# Patient Record
Sex: MALE | Race: WHITE | NOT HISPANIC OR LATINO | URBAN - METROPOLITAN AREA
[De-identification: names, ages, dates, MRNs, and addresses within clinical notes are randomized per-mention and may not be internally consistent; named-entity substitution may affect disease eponyms.]

---

## 2022-04-01 ENCOUNTER — OUTPATIENT (OUTPATIENT)
Dept: OUTPATIENT SERVICES | Facility: HOSPITAL | Age: 52
LOS: 1 days | End: 2022-04-01
Payer: COMMERCIAL

## 2022-04-01 VITALS
SYSTOLIC BLOOD PRESSURE: 123 MMHG | OXYGEN SATURATION: 98 % | WEIGHT: 160.94 LBS | RESPIRATION RATE: 14 BRPM | HEART RATE: 68 BPM | TEMPERATURE: 97 F | HEIGHT: 65 IN | DIASTOLIC BLOOD PRESSURE: 67 MMHG

## 2022-04-01 DIAGNOSIS — K40.90 UNILATERAL INGUINAL HERNIA, WITHOUT OBSTRUCTION OR GANGRENE, NOT SPECIFIED AS RECURRENT: ICD-10-CM

## 2022-04-01 DIAGNOSIS — Z98.890 OTHER SPECIFIED POSTPROCEDURAL STATES: Chronic | ICD-10-CM

## 2022-04-01 DIAGNOSIS — Z01.818 ENCOUNTER FOR OTHER PREPROCEDURAL EXAMINATION: ICD-10-CM

## 2022-04-01 PROBLEM — Z00.00 ENCOUNTER FOR PREVENTIVE HEALTH EXAMINATION: Status: ACTIVE | Noted: 2022-04-01

## 2022-04-01 LAB
ANION GAP SERPL CALC-SCNC: 5 MMOL/L — SIGNIFICANT CHANGE UP (ref 5–17)
BUN SERPL-MCNC: 15 MG/DL — SIGNIFICANT CHANGE UP (ref 7–23)
CALCIUM SERPL-MCNC: 8.8 MG/DL — SIGNIFICANT CHANGE UP (ref 8.4–10.5)
CHLORIDE SERPL-SCNC: 103 MMOL/L — SIGNIFICANT CHANGE UP (ref 96–108)
CO2 SERPL-SCNC: 29 MMOL/L — SIGNIFICANT CHANGE UP (ref 22–31)
CREAT SERPL-MCNC: 1.27 MG/DL — SIGNIFICANT CHANGE UP (ref 0.5–1.3)
EGFR: 68 ML/MIN/1.73M2 — SIGNIFICANT CHANGE UP
GLUCOSE SERPL-MCNC: 107 MG/DL — HIGH (ref 70–99)
HCT VFR BLD CALC: 45.4 % — SIGNIFICANT CHANGE UP (ref 39–50)
HGB BLD-MCNC: 15.8 G/DL — SIGNIFICANT CHANGE UP (ref 13–17)
MCHC RBC-ENTMCNC: 31.2 PG — SIGNIFICANT CHANGE UP (ref 27–34)
MCHC RBC-ENTMCNC: 34.8 GM/DL — SIGNIFICANT CHANGE UP (ref 32–36)
MCV RBC AUTO: 89.5 FL — SIGNIFICANT CHANGE UP (ref 80–100)
NRBC # BLD: 0 /100 WBCS — SIGNIFICANT CHANGE UP (ref 0–0)
PLATELET # BLD AUTO: 237 K/UL — SIGNIFICANT CHANGE UP (ref 150–400)
POTASSIUM SERPL-MCNC: 4.1 MMOL/L — SIGNIFICANT CHANGE UP (ref 3.5–5.3)
POTASSIUM SERPL-SCNC: 4.1 MMOL/L — SIGNIFICANT CHANGE UP (ref 3.5–5.3)
RBC # BLD: 5.07 M/UL — SIGNIFICANT CHANGE UP (ref 4.2–5.8)
RBC # FLD: 11.9 % — SIGNIFICANT CHANGE UP (ref 10.3–14.5)
SODIUM SERPL-SCNC: 137 MMOL/L — SIGNIFICANT CHANGE UP (ref 135–145)
WBC # BLD: 5.71 K/UL — SIGNIFICANT CHANGE UP (ref 3.8–10.5)
WBC # FLD AUTO: 5.71 K/UL — SIGNIFICANT CHANGE UP (ref 3.8–10.5)

## 2022-04-01 PROCEDURE — 80048 BASIC METABOLIC PNL TOTAL CA: CPT

## 2022-04-01 PROCEDURE — 36415 COLL VENOUS BLD VENIPUNCTURE: CPT

## 2022-04-01 PROCEDURE — G0463: CPT

## 2022-04-01 PROCEDURE — 85027 COMPLETE CBC AUTOMATED: CPT

## 2022-04-01 PROCEDURE — 93005 ELECTROCARDIOGRAM TRACING: CPT

## 2022-04-01 PROCEDURE — 93010 ELECTROCARDIOGRAM REPORT: CPT

## 2022-04-01 NOTE — H&P PST ADULT - GENERAL
Rx for Zpak sent to pharmacy.  Follow-up if still not improving after another week or symptoms worsen.  Thanks.    Chidi Valenzuela MD    negative

## 2022-04-01 NOTE — H&P PST ADULT - NSICDXPASTMEDICALHX_GEN_ALL_CORE_FT
PAST MEDICAL HISTORY:  Right inguinal hernia      PAST MEDICAL HISTORY:  COVID-19 vaccine series declined     Right inguinal hernia

## 2022-04-01 NOTE — H&P PST ADULT - PROBLEM SELECTOR PLAN 1
scheduled for right inguinal hernia repair on 4/14/22  Pre op instructions  COVID test on 4/12/22 scheduled for right inguinal hernia repair on 4/14/22  patient was referred to cardiologist due to abnormal EKG , cardiologist  will address the EKG in the clearance   Pre op instructions  COVID test on 4/11/22 at 8;30 am

## 2022-04-01 NOTE — H&P PST ADULT - NSANTHOSAYNRD_GEN_A_CORE
No. CHARISSE screening performed.  STOP BANG Legend: 0-2 = LOW Risk; 3-4 = INTERMEDIATE Risk; 5-8 = HIGH Risk

## 2022-04-01 NOTE — H&P PST ADULT - HISTORY OF PRESENT ILLNESS
This is a 51 y/o male with complainrt right inguinal hernia  This is a 51 y/o male presents with complaint of right inguinal hernia . Reports right inguinal pain, bulging  with lifting and activities  patient states that he felt a sharp pain while playing baseball 4 year ago since then he has been experiencing right groin pain and discomfort  scheduled for right inguinal hernia repair on 4/14/22

## 2022-04-01 NOTE — H&P PST ADULT - NSICDXFAMILYHX_GEN_ALL_CORE_FT
FAMILY HISTORY:  Father  Still living? No  FH: lymphoma, Age at diagnosis: Age Unknown    Mother  Still living? Yes, Estimated age: 71-80  FH: CHF (congestive heart failure), Age at diagnosis: Age Unknown  FH: type 2 diabetes mellitus, Age at diagnosis: Age Unknown

## 2022-04-04 RX ORDER — CHLORHEXIDINE GLUCONATE 213 G/1000ML
1 SOLUTION TOPICAL ONCE
Refills: 0 | Status: DISCONTINUED | OUTPATIENT
Start: 2022-04-14 | End: 2022-04-28

## 2022-04-11 ENCOUNTER — OUTPATIENT (OUTPATIENT)
Dept: OUTPATIENT SERVICES | Facility: HOSPITAL | Age: 52
LOS: 1 days | End: 2022-04-11
Payer: COMMERCIAL

## 2022-04-11 DIAGNOSIS — Z20.828 CONTACT WITH AND (SUSPECTED) EXPOSURE TO OTHER VIRAL COMMUNICABLE DISEASES: ICD-10-CM

## 2022-04-11 DIAGNOSIS — Z98.890 OTHER SPECIFIED POSTPROCEDURAL STATES: Chronic | ICD-10-CM

## 2022-04-11 LAB — SARS-COV-2 RNA SPEC QL NAA+PROBE: SIGNIFICANT CHANGE UP

## 2022-04-11 PROCEDURE — U0005: CPT

## 2022-04-11 PROCEDURE — U0003: CPT

## 2022-04-13 ENCOUNTER — TRANSCRIPTION ENCOUNTER (OUTPATIENT)
Age: 52
End: 2022-04-13

## 2022-04-13 NOTE — ASU PATIENT PROFILE, ADULT - FALL HARM RISK - UNIVERSAL INTERVENTIONS
Bed in lowest position, wheels locked, appropriate side rails in place/Call bell, personal items and telephone in reach/Instruct patient to call for assistance before getting out of bed or chair/Non-slip footwear when patient is out of bed/[place] to call system/Physically safe environment - no spills, clutter or unnecessary equipment/Purposeful Proactive Rounding/Room/bathroom lighting operational, light cord in reach

## 2022-04-14 ENCOUNTER — TRANSCRIPTION ENCOUNTER (OUTPATIENT)
Age: 52
End: 2022-04-14

## 2022-04-14 ENCOUNTER — OUTPATIENT (OUTPATIENT)
Dept: OUTPATIENT SERVICES | Facility: HOSPITAL | Age: 52
LOS: 1 days | End: 2022-04-14
Payer: COMMERCIAL

## 2022-04-14 ENCOUNTER — RESULT REVIEW (OUTPATIENT)
Age: 52
End: 2022-04-14

## 2022-04-14 VITALS
TEMPERATURE: 97 F | HEART RATE: 82 BPM | OXYGEN SATURATION: 100 % | DIASTOLIC BLOOD PRESSURE: 77 MMHG | HEIGHT: 66 IN | SYSTOLIC BLOOD PRESSURE: 128 MMHG | WEIGHT: 155.21 LBS | RESPIRATION RATE: 14 BRPM

## 2022-04-14 VITALS
DIASTOLIC BLOOD PRESSURE: 80 MMHG | RESPIRATION RATE: 14 BRPM | OXYGEN SATURATION: 99 % | SYSTOLIC BLOOD PRESSURE: 124 MMHG | HEART RATE: 62 BPM

## 2022-04-14 DIAGNOSIS — K40.90 UNILATERAL INGUINAL HERNIA, WITHOUT OBSTRUCTION OR GANGRENE, NOT SPECIFIED AS RECURRENT: ICD-10-CM

## 2022-04-14 DIAGNOSIS — K40.20 BILATERAL INGUINAL HERNIA, WITHOUT OBSTRUCTION OR GANGRENE, NOT SPECIFIED AS RECURRENT: ICD-10-CM

## 2022-04-14 DIAGNOSIS — Z01.818 ENCOUNTER FOR OTHER PREPROCEDURAL EXAMINATION: ICD-10-CM

## 2022-04-14 DIAGNOSIS — Z98.890 OTHER SPECIFIED POSTPROCEDURAL STATES: Chronic | ICD-10-CM

## 2022-04-14 PROCEDURE — 49505 PRP I/HERN INIT REDUC >5 YR: CPT | Mod: 50

## 2022-04-14 PROCEDURE — 88302 TISSUE EXAM BY PATHOLOGIST: CPT

## 2022-04-14 PROCEDURE — C1781: CPT

## 2022-04-14 PROCEDURE — 88302 TISSUE EXAM BY PATHOLOGIST: CPT | Mod: 26

## 2022-04-14 DEVICE — PLUG AND PATCH PHASIX 1.6X1.3IN MED: Type: IMPLANTABLE DEVICE | Site: BILATERAL | Status: FUNCTIONAL

## 2022-04-14 DEVICE — PLUG AND PATCH PHASIX LG 4.1X4.8CM: Type: IMPLANTABLE DEVICE | Site: BILATERAL | Status: FUNCTIONAL

## 2022-04-14 RX ORDER — SODIUM CHLORIDE 9 MG/ML
1000 INJECTION, SOLUTION INTRAVENOUS
Refills: 0 | Status: DISCONTINUED | OUTPATIENT
Start: 2022-04-14 | End: 2022-04-15

## 2022-04-14 RX ORDER — HYDROMORPHONE HYDROCHLORIDE 2 MG/ML
0.5 INJECTION INTRAMUSCULAR; INTRAVENOUS; SUBCUTANEOUS
Refills: 0 | Status: DISCONTINUED | OUTPATIENT
Start: 2022-04-14 | End: 2022-04-15

## 2022-04-14 RX ORDER — HYDROMORPHONE HYDROCHLORIDE 2 MG/ML
1 INJECTION INTRAMUSCULAR; INTRAVENOUS; SUBCUTANEOUS
Refills: 0 | Status: DISCONTINUED | OUTPATIENT
Start: 2022-04-14 | End: 2022-04-15

## 2022-04-14 RX ORDER — OXYCODONE HYDROCHLORIDE 5 MG/1
1 TABLET ORAL
Qty: 10 | Refills: 0
Start: 2022-04-14

## 2022-04-14 RX ORDER — ONDANSETRON 8 MG/1
4 TABLET, FILM COATED ORAL ONCE
Refills: 0 | Status: DISCONTINUED | OUTPATIENT
Start: 2022-04-14 | End: 2022-04-15

## 2022-04-14 RX ORDER — METOCLOPRAMIDE HCL 10 MG
10 TABLET ORAL ONCE
Refills: 0 | Status: DISCONTINUED | OUTPATIENT
Start: 2022-04-14 | End: 2022-04-15

## 2022-04-14 RX ADMIN — SODIUM CHLORIDE 75 MILLILITER(S): 9 INJECTION, SOLUTION INTRAVENOUS at 13:30

## 2022-04-14 NOTE — ASU DISCHARGE PLAN (ADULT/PEDIATRIC) - CARE PROVIDER_API CALL
Radha Torres (MD)  ColonRectal Surgery; Surgery  [address] [facility] [address], [address]  [place] [address]  Phone: [phone]  Fax: [phone]  Follow Up Time:

## 2022-04-14 NOTE — BRIEF OPERATIVE NOTE - NSICDXBRIEFPROCEDURE_GEN_ALL_CORE_FT
PROCEDURES:  Open repair of inguinal hernia using mesh in adult 14-Apr-2022 13:40:34 bilateral Irlanda Ren

## 2022-04-14 NOTE — ASU DISCHARGE PLAN (ADULT/PEDIATRIC) - NS MD DC FALL RISK RISK
For information on Fall & Injury Prevention, visit: [email or web address] OR  [email or web address] OR  https://www.cdc.gov/steadi/patient.html

## 2022-04-14 NOTE — ASU DISCHARGE PLAN (ADULT/PEDIATRIC) - ASU DC SPECIAL INSTRUCTIONSFT
REST! Apply ice packs to incision sites 20 mins on, 20 mins off every 4 hours for first 24 hours.     You may take plain Tylenol, ibuprofen (Advil, Motrin), or Aleve if you wish alternating with oxycodone prescription for severe pain     If having trouble having a bowel movement:   - Metamucil or Konsyl (fiber supplement, available over the counter), 1 tablespoon in 8 oz. of water or juice twice a day  - Colace (stool softener, available over the counter), 100mg three times a day    Keep white steri strips on until follow up at office. Sometimes they fall off on own and thats okay. Okay to shower in 24 hours.     Please call [facility] office ([phone]) to schedule a follow-up appointment to be seen in 10-14 days.

## 2022-04-18 LAB — SURGICAL PATHOLOGY STUDY: SIGNIFICANT CHANGE UP

## 2022-05-17 ENCOUNTER — EMERGENCY (EMERGENCY)
Facility: HOSPITAL | Age: 52
LOS: 1 days | Discharge: HOME | End: 2022-05-17
Attending: INTERNAL MEDICINE
Payer: COMMERCIAL

## 2022-05-17 VITALS
OXYGEN SATURATION: 97 % | WEIGHT: 154.32 LBS | TEMPERATURE: 98 F | DIASTOLIC BLOOD PRESSURE: 80 MMHG | RESPIRATION RATE: 18 BRPM | SYSTOLIC BLOOD PRESSURE: 117 MMHG | HEIGHT: 66 IN | HEART RATE: 74 BPM

## 2022-05-17 VITALS
DIASTOLIC BLOOD PRESSURE: 71 MMHG | HEART RATE: 77 BPM | OXYGEN SATURATION: 97 % | RESPIRATION RATE: 19 BRPM | SYSTOLIC BLOOD PRESSURE: 120 MMHG | TEMPERATURE: 98 F

## 2022-05-17 DIAGNOSIS — Z98.890 OTHER SPECIFIED POSTPROCEDURAL STATES: Chronic | ICD-10-CM

## 2022-05-17 PROCEDURE — 99284 EMERGENCY DEPT VISIT MOD MDM: CPT

## 2022-05-17 PROCEDURE — 99284 EMERGENCY DEPT VISIT MOD MDM: CPT | Mod: 25

## 2022-05-17 PROCEDURE — 93971 EXTREMITY STUDY: CPT | Mod: 26,LT

## 2022-05-17 PROCEDURE — 93971 EXTREMITY STUDY: CPT

## 2022-05-17 NOTE — ED PROVIDER NOTE - NS ED ATTENDING STATEMENT MOD
This was a shared visit with the DUSTY. I reviewed and verified the documentation and independently performed the documented:

## 2022-05-17 NOTE — ED PROVIDER NOTE - OBJECTIVE STATEMENT
otherwise healthy 52 year old male presents with left calf discomfort that started today. felt a sudden pain to left calf today. no specific injury or trauma. then pain improved, but returned a couple more times throughout the day. has a slight occasional throb to calf just distal to popliteal region.  no pain with ambulation. no swelling. no warmth or erythema. reports having bilateral inguinal hernia repair 5 weeks ago and "wants to make sure he doesn't have a DVT". no chest pain or shortness of breath. no history of DVT or PE. no history of cancer   PCP Toni Bui

## 2022-05-17 NOTE — ED PROVIDER NOTE - NSFOLLOWUPINSTRUCTIONS_ED_ALL_ED_FT
rest, ice, compression, elevation, support  motrin over the counter, 600 mg three times a day with food  follow up with primary care provider if pain continues          Muscle Strain    WHAT YOU NEED TO KNOW:    A muscle strain is a twist, pull, or tear of a muscle or tendon. A tendon is a strong elastic tissue that connects a muscle to a bone. Signs of a strained muscle include bruising and swelling over the area, pain with movement, and loss of strength.    DISCHARGE INSTRUCTIONS:    Return to the emergency department if:   •You suddenly cannot feel or move your injured muscle.          Call your doctor if:   •Your pain and swelling worsen or do not go away.      •You have questions or concerns about your condition or care.      Medicines: You may need any of the following:  •NSAIDs help decrease swelling and pain or fever. This medicine is available with or without a doctor's order. NSAIDs can cause stomach bleeding or kidney problems in certain people. If you take blood thinner medicine, always ask your healthcare provider if NSAIDs are safe for you. Always read the medicine label and follow directions.      •Muscle relaxers help decrease pain and muscle spasms.      •Take your medicine as directed. Contact your healthcare provider if you think your medicine is not helping or if you have side effects. Tell him of her if you are allergic to any medicine. Keep a list of the medicines, vitamins, and herbs you take. Include the amounts, and when and why you take them. Bring the list or the pill bottles to follow-up visits. Carry your medicine list with you in case of an emergency.      Help a muscle strain heal:   •3 to 7 days after the injury: Use Rest, Ice, Compression, and Elevation (RICE) to help stop bruising and decrease pain and swelling.?Rest your muscle to allow the injury to heal. When the pain decreases, begin normal, slow movements. For mild and moderate muscle strains, you should rest your muscles for about 2 days. If you have a severe muscle strain, you should rest for 10 to 14 days. You may need to use crutches to walk if your muscle strain is in your legs or lower body.      ?Apply ice on the injured area. Use an ice pack, or put crushed ice in a plastic bag. Cover the bag with a towel before you apply it to your skin. Apply ice for 15 to 20 minutes each hour, or as directed.      ?Use compression to decrease swelling. You can wrap an elastic bandage around the area to create compression. The bandage should be tight enough for you to feel support. Do not wrap it too tightly.  How to Wrap an Elastic Bandage           ?Elevate the area above the level of your heart, if possible. Keep the injured muscle raised above your heart if possible. For example, if you have a strain of your lower leg muscle, lie down and prop your leg up on pillows. This helps decrease pain and swelling.  Elevate Leg           •3 to 21 days after your injury: Start to slowly and regularly exercise your strained muscle. This will help it heal. If you feel pain, decrease how hard you are exercising.      •1 to 6 weeks after your injury: Stretch the injured muscle. Stretch the muscle for about 30 seconds. Do this 4 times a day. You may stretch the muscle until you feel a slight pull. Stop stretching if you feel pain.      •2 weeks to 6 months after your injury: The goal of this phase is to return to the activity you were doing before the injury without hurting the muscle again.      •3 weeks to 6 months after your injury: Keep stretching and strengthening your muscles to prevent injury. Slowly increase the time and distance that you exercise. You may still have signs and symptoms of muscle strain 6 months after the injury, even if you do things to help it heal. In this case, you may need surgery on the muscle.      Prevent muscle strains:   •Always wear proper shoes when you play sports. Replace your old running shoes with new ones often if you are a runner. Use special shoe inserts or arch supports to correct leg or foot problems. Ask your healthcare provider for more information on shoe supports.      •Do warm up and cool down exercises. Do stretching exercises before you work out or do sports activities. These exercises will help loosen and decrease stress on your muscles. Cool down and stretch after your workout. Do not stop and rest after a workout without cooling down first.  Warm up and Cool Down            •Keep your muscles strong with strength training exercises. Exercises such as weight lifting and stretching exercises help keep your muscles flexible and strong. A physical therapist or  may help you with these exercises.  Strength Training for Adults           •Slowly start your exercise or sports training program. Follow your healthcare provider's advice on when to start exercising. Slowly increase time, distance, and how often you train. Sudden increases in how often you train may cause you to injure your muscle again.      Follow up with your doctor as directed: Your doctor may suggest that you have a follow-up visit before you go back to your usual activity. Write down your questions so you remember to ask them during your visits.

## 2022-05-17 NOTE — ED ADULT NURSE NOTE - OBJECTIVE STATEMENT
patient has c/o left groin pain patient has c/o left groin pain, recent hernia surgery b/l groin area, c/o tightness and throbbing pain, denies chest pain or sob at this time, will continue to monitor.

## 2022-05-17 NOTE — ED ADULT NURSE NOTE - TEMPLATE
Thalidomide Counseling: I discussed with the patient the risks of thalidomide including but not limited to birth defects, anxiety, weakness, chest pain, dizziness, cough and severe allergy. General

## 2022-05-17 NOTE — ED PROVIDER NOTE - PATIENT PORTAL LINK FT
You can access the FollowMyHealth Patient Portal offered by [facility] by registering at the following website: [email or web address]. By joining [employer]’s FollowMyHealth portal, you will also be able to view your health information using other applications (apps) compatible with our system.

## 2022-05-17 NOTE — ED ADULT NURSE NOTE - NS ED NURSE RECORD ANOTHER HT AND WT
[FreeTextEntry1] : 58 yo pt here for fu and vulvar bx. reports she gets severe pain from simple touch near clitoris, has fibromyalgia, tent dx sjogrens recently. wonders if she has vulvodynia and wants to discuss tx. already take tricyclic antidepressant for fibromyalgia and ibs. spot that gets a small tear on inner lab minora is bothering her today. Yes

## 2022-05-17 NOTE — ED PROVIDER NOTE - MUSCULOSKELETAL, MLM
mild soft tissue tenderness just distal to left popliteal fossa. no bony tenderness. full ROM. no swelling or palpable cords

## 2022-05-17 NOTE — ED PROVIDER NOTE - CLINICAL SUMMARY MEDICAL DECISION MAKING FREE TEXT BOX
52 year old male presents with left calf discomfort that started today. felt a sudden pain to left calf today. no specific injury or trauma. then pain improved, but returned a couple more times throughout the day. has a slight occasional throb to calf just distal to popliteal region. no cp, no sob, no fever, no chills. He came to the ED for doppler to R/O DVT.  US was reported negative for DVT, discharged home in stable condition and to F/U O/P

## 2022-05-17 NOTE — ED PROVIDER NOTE - CARE PROVIDER_API CALL
OMAYRA MERCHANT  Cardiovascular Diseases  [address] [place] [address]  Phone: [phone]  Fax: [phone]  Follow Up Time: 1-3 Days

## 2022-05-18 PROBLEM — K40.90 UNILATERAL INGUINAL HERNIA, WITHOUT OBSTRUCTION OR GANGRENE, NOT SPECIFIED AS RECURRENT: Chronic | Status: ACTIVE | Noted: 2022-04-01

## 2022-05-18 PROBLEM — Z28.21 IMMUNIZATION NOT CARRIED OUT BECAUSE OF PATIENT REFUSAL: Chronic | Status: ACTIVE | Noted: 2022-04-01

## 2022-09-13 ENCOUNTER — EMERGENCY (EMERGENCY)
Facility: HOSPITAL | Age: 52
LOS: 1 days | Discharge: HOME | End: 2022-09-13
Attending: EMERGENCY MEDICINE | Admitting: EMERGENCY MEDICINE
Payer: COMMERCIAL

## 2022-09-13 VITALS
HEART RATE: 73 BPM | RESPIRATION RATE: 18 BRPM | DIASTOLIC BLOOD PRESSURE: 76 MMHG | WEIGHT: 154.32 LBS | OXYGEN SATURATION: 97 % | SYSTOLIC BLOOD PRESSURE: 119 MMHG | HEIGHT: 66 IN | TEMPERATURE: 98 F

## 2022-09-13 DIAGNOSIS — Z98.890 OTHER SPECIFIED POSTPROCEDURAL STATES: Chronic | ICD-10-CM

## 2022-09-13 LAB
ALBUMIN SERPL ELPH-MCNC: 4.4 G/DL — SIGNIFICANT CHANGE UP (ref 3.3–5)
ALP SERPL-CCNC: 87 U/L — SIGNIFICANT CHANGE UP (ref 30–120)
ALT FLD-CCNC: 27 U/L DA — SIGNIFICANT CHANGE UP (ref 10–60)
ANION GAP SERPL CALC-SCNC: 8 MMOL/L — SIGNIFICANT CHANGE UP (ref 5–17)
APTT BLD: 34.6 SEC — SIGNIFICANT CHANGE UP (ref 27.5–35.5)
AST SERPL-CCNC: 20 U/L — SIGNIFICANT CHANGE UP (ref 10–40)
BASOPHILS # BLD AUTO: 0.05 K/UL — SIGNIFICANT CHANGE UP (ref 0–0.2)
BASOPHILS NFR BLD AUTO: 0.6 % — SIGNIFICANT CHANGE UP (ref 0–2)
BILIRUB SERPL-MCNC: 0.9 MG/DL — SIGNIFICANT CHANGE UP (ref 0.2–1.2)
BUN SERPL-MCNC: 21 MG/DL — SIGNIFICANT CHANGE UP (ref 7–23)
CALCIUM SERPL-MCNC: 9.1 MG/DL — SIGNIFICANT CHANGE UP (ref 8.4–10.5)
CHLORIDE SERPL-SCNC: 100 MMOL/L — SIGNIFICANT CHANGE UP (ref 96–108)
CO2 SERPL-SCNC: 30 MMOL/L — SIGNIFICANT CHANGE UP (ref 22–31)
CREAT SERPL-MCNC: 1.12 MG/DL — SIGNIFICANT CHANGE UP (ref 0.5–1.3)
EGFR: 80 ML/MIN/1.73M2 — SIGNIFICANT CHANGE UP
EOSINOPHIL # BLD AUTO: 0.04 K/UL — SIGNIFICANT CHANGE UP (ref 0–0.5)
EOSINOPHIL NFR BLD AUTO: 0.5 % — SIGNIFICANT CHANGE UP (ref 0–6)
GLUCOSE SERPL-MCNC: 123 MG/DL — HIGH (ref 70–99)
HCT VFR BLD CALC: 45.3 % — SIGNIFICANT CHANGE UP (ref 39–50)
HGB BLD-MCNC: 15.9 G/DL — SIGNIFICANT CHANGE UP (ref 13–17)
IMM GRANULOCYTES NFR BLD AUTO: 0.2 % — SIGNIFICANT CHANGE UP (ref 0–1.5)
INR BLD: 1.12 RATIO — SIGNIFICANT CHANGE UP (ref 0.88–1.16)
LIDOCAIN IGE QN: 166 U/L — SIGNIFICANT CHANGE UP (ref 73–393)
LYMPHOCYTES # BLD AUTO: 2.02 K/UL — SIGNIFICANT CHANGE UP (ref 1–3.3)
LYMPHOCYTES # BLD AUTO: 24 % — SIGNIFICANT CHANGE UP (ref 13–44)
MAGNESIUM SERPL-MCNC: 2.1 MG/DL — SIGNIFICANT CHANGE UP (ref 1.6–2.6)
MCHC RBC-ENTMCNC: 31.1 PG — SIGNIFICANT CHANGE UP (ref 27–34)
MCHC RBC-ENTMCNC: 35.1 GM/DL — SIGNIFICANT CHANGE UP (ref 32–36)
MCV RBC AUTO: 88.5 FL — SIGNIFICANT CHANGE UP (ref 80–100)
MONOCYTES # BLD AUTO: 0.82 K/UL — SIGNIFICANT CHANGE UP (ref 0–0.9)
MONOCYTES NFR BLD AUTO: 9.7 % — SIGNIFICANT CHANGE UP (ref 2–14)
NEUTROPHILS # BLD AUTO: 5.48 K/UL — SIGNIFICANT CHANGE UP (ref 1.8–7.4)
NEUTROPHILS NFR BLD AUTO: 65 % — SIGNIFICANT CHANGE UP (ref 43–77)
NRBC # BLD: 0 /100 WBCS — SIGNIFICANT CHANGE UP (ref 0–0)
PLATELET # BLD AUTO: 258 K/UL — SIGNIFICANT CHANGE UP (ref 150–400)
POTASSIUM SERPL-MCNC: 3.9 MMOL/L — SIGNIFICANT CHANGE UP (ref 3.5–5.3)
POTASSIUM SERPL-SCNC: 3.9 MMOL/L — SIGNIFICANT CHANGE UP (ref 3.5–5.3)
PROT SERPL-MCNC: 7.4 G/DL — SIGNIFICANT CHANGE UP (ref 6–8.3)
PROTHROM AB SERPL-ACNC: 12.9 SEC — SIGNIFICANT CHANGE UP (ref 10.5–13.4)
RBC # BLD: 5.12 M/UL — SIGNIFICANT CHANGE UP (ref 4.2–5.8)
RBC # FLD: 11.8 % — SIGNIFICANT CHANGE UP (ref 10.3–14.5)
SODIUM SERPL-SCNC: 138 MMOL/L — SIGNIFICANT CHANGE UP (ref 135–145)
TROPONIN I, HIGH SENSITIVITY RESULT: 18.2 NG/L — SIGNIFICANT CHANGE UP
WBC # BLD: 8.43 K/UL — SIGNIFICANT CHANGE UP (ref 3.8–10.5)
WBC # FLD AUTO: 8.43 K/UL — SIGNIFICANT CHANGE UP (ref 3.8–10.5)

## 2022-09-13 PROCEDURE — 93005 ELECTROCARDIOGRAM TRACING: CPT

## 2022-09-13 PROCEDURE — 85025 COMPLETE CBC W/AUTO DIFF WBC: CPT

## 2022-09-13 PROCEDURE — 83735 ASSAY OF MAGNESIUM: CPT

## 2022-09-13 PROCEDURE — 80053 COMPREHEN METABOLIC PANEL: CPT

## 2022-09-13 PROCEDURE — 93010 ELECTROCARDIOGRAM REPORT: CPT

## 2022-09-13 PROCEDURE — 85730 THROMBOPLASTIN TIME PARTIAL: CPT

## 2022-09-13 PROCEDURE — 83690 ASSAY OF LIPASE: CPT

## 2022-09-13 PROCEDURE — 36415 COLL VENOUS BLD VENIPUNCTURE: CPT

## 2022-09-13 PROCEDURE — 99283 EMERGENCY DEPT VISIT LOW MDM: CPT

## 2022-09-13 PROCEDURE — 85610 PROTHROMBIN TIME: CPT

## 2022-09-13 PROCEDURE — 84484 ASSAY OF TROPONIN QUANT: CPT

## 2022-09-13 PROCEDURE — 99285 EMERGENCY DEPT VISIT HI MDM: CPT

## 2022-09-13 RX ORDER — ALPRAZOLAM 0.25 MG
0.25 TABLET ORAL ONCE
Refills: 0 | Status: DISCONTINUED | OUTPATIENT
Start: 2022-09-13 | End: 2022-09-13

## 2022-09-13 NOTE — ED PROVIDER NOTE - CLINICAL SUMMARY MEDICAL DECISION MAKING FREE TEXT BOX
52 year old male with facial and left arm tightness. Pt symptoms likely anxiety driven. Pt is aware of this, offered head CT but pt declines. Neurologically intact, no deficits. Plan EKG, labs, troponin, Xanax, and reassess.

## 2022-09-13 NOTE — ED PROVIDER NOTE - NSICDXPASTMEDICALHX_GEN_ALL_CORE_FT
PAST MEDICAL HISTORY:  COVID-19 vaccine series declined     Right inguinal hernia       Severe anxiety

## 2022-09-13 NOTE — ED PROVIDER NOTE - PHYSICAL EXAMINATION
Gen: Alert, NAD, appears moderately anxious  Head/eyes: NC/AT, PERRL  ENT: airway patent  Neck: supple  Pulm/lung: Bilateral clear BS  CV/heart: RRR  GI/Abd: soft, NT/ND, +BS, no guarding/rebound tenderness  Musculoskeletal: no edema/erythema/cyanosis  Skin: no rash  Neuro: AAOx3, grossly intact

## 2022-09-13 NOTE — ED ADULT NURSE NOTE - OBJECTIVE STATEMENT
AAOx3 c/o left sided facial numbness. Denies trauma, fall, CP, HA, or dizziness. Well appearing. Ambulates with a steady gait. Labs done, EKG completed.

## 2022-09-13 NOTE — ED PROVIDER NOTE - NSFOLLOWUPINSTRUCTIONS_ED_ALL_ED_FT
1) Follow-up with your Primary Medical Doctor or referred doctor. Call today / next business day for prompt follow-up.  2) Return to Emergency room for any worsening or persistent pain, weakness, fever, or any other concerning symptoms.  3) See attached instruction sheets for additional information, including information regarding signs and symptoms to look out for, reasons to seek immediate care and other important instructions.  4) Follow-up with any specialists as discussed / noted as well.     aresthesia is an abnormal burning or prickling sensation. It is usually felt in the hands, arms, legs, or feet. However, it may occur in any part of the body. Usually, paresthesia is not painful. It may feel like:  •Tingling or numbness.      •Buzzing.      •Itching.      Paresthesia may occur without any clear cause, or it may be caused by:  •Breathing too quickly (hyperventilation).      •Pressure on a nerve.      •An underlying medical condition.      •Side effects of a medication.      •Nutritional deficiencies.      •Exposure to toxic chemicals.      Most people experience temporary (transient) paresthesia at some time in their lives. For some people, it may be long-lasting (chronic) because of an underlying medical condition. If you have paresthesia that lasts a long time, you need to be evaluated by your health care provider.      Follow these instructions at home:      Alcohol use    • Do not drink alcohol if:  •Your health care provider tells you not to drink.      •You are pregnant, may be pregnant, or are planning to become pregnant.      •If you drink alcohol:•Limit how much you use to:  •0–1 drink a day for women.      •0–2 drinks a day for men.        •Be aware of how much alcohol is in your drink. In the [place] one drink equals one 12 oz bottle of beer (355 mL), one 5 oz glass of wine (148 mL), or one 1½ oz glass of hard liquor (44 mL).          Nutrition    •Eat a healthy diet. This includes:  •Eating foods that are high in fiber, such as fresh fruits and vegetables, whole grains, and beans.      •Limiting foods that are high in fat and processed sugars, such as fried or sweet foods.        General instructions     •Take over-the-counter and prescription medicines only as told by your health care provider.      • Do not use any products that contain nicotine or tobacco, such as cigarettes and e-cigarettes. These can keep blood from reaching damaged nerves. If you need help quitting, ask your health care provider.      •If you have diabetes, work closely with your health care provider to keep your blood sugar under control.    •If you have numbness in your feet:  •Check every day for signs of injury or infection. Watch for redness, warmth, and swelling.      •Wear padded socks and comfortable shoes. These help protect your feet.        •Keep all follow-up visits as told by your health care provider. This is important.        Contact a health care provider if you:    •Have paresthesia that gets worse or does not go away.      •Have numbness after an injury.      •Have a burning or prickling feeling that gets worse when you walk.      •Have pain, cramps, or dizziness, or you faint.      •Develop a rash.        Get help right away if you:    •Feel muscle weakness.      •Develop new weakness in an arm or leg.      •Have trouble walking or moving.      •Have problems with speech, understanding, or vision.      •Feel confused.      •Cannot control your bladder or bowel movements.        Summary    •Paresthesia is an abnormal burning or prickling sensation that is usually felt in the hands, arms, legs, or feet. It may also occur in other parts of the body.      •Paresthesia may occur without any clear cause, or it may be caused by breathing too quickly (hyperventilation), pressure on a nerve, an underlying medical condition, side effects of a medication, nutritional deficiencies, or exposure to toxic chemicals.      •If you have paresthesia that lasts a long time, you need to be evaluated by your health care provider.      This information is not intended to replace advice given to you by your health care provider. Make sure you discuss any questions you have with your health care provider.

## 2022-09-13 NOTE — ED PROVIDER NOTE - OBJECTIVE STATEMENT
52 year old male with PMHx of right inguinal hernia and anxiety presents to the ED complaining of left face numbness and left arm tightness that started 30mins PTA. Pt was driving to psychiatrist appointment and had sudden onset of tightness in left face and arm. Pt has had high anxiety for past few weeks, has been under a lot of stress. Pt reports recent inability to sleep and reports "not feeling like himself." Pt was seen at cardiologist Dr. Bethel Bui. yesterday and had full workup. Denies any pain. Pt had blood work on 8/31, with normal results.

## 2022-09-13 NOTE — ED PROVIDER NOTE - PROGRESS NOTE DETAILS
labs explained, feels better, declining xanax, will f/u with his primary, neuro/motor intact, no deficits, normal sensation b/l side of face

## 2022-09-13 NOTE — ED PROVIDER NOTE - NS ED ROS FT
Constitutional: - Fever, - Chills, - Anorexia, - Fatigue, - Night sweats, + facial numbness/tightness, +anxiety  Eyes: - Discharge, - Irritation, - Redness, - Visual changes, - Light sensitivity, - Pain  EARS: - Ear Pain, - Tinnitus, - Decreased hearing  NOSE: - Congestion, - Epistaxis  MOUTH/THROAT: - Vocal Changes, - Drooling, - Sore throat  NECK: - Lumps, - Stiffness, - Pain  CV: - Palpitations, - Chest Pain, - Edema, - Syncope  RESP:  - Cough, - Shortness of Breath, - Dyspnea on Exertion, - Trouble speaking, - Pleuritic pain - Wheezing  GI: - Diarrhea, - Constipation, - Bloody stools, - Nausea, - Vomiting, - Abdominal Pain  : - Dysuria, -Frequency, - Hematuria, - Hesitancy, - Incontinence, - Saddle Anesthesia, - Abnormal discharge  MSK: + left arm tightness, - Arthralgias, - Weakness, - Deformities, - Injuries  SKIN: - Color change, - Rash, - Swelling, - Ecchymosis, - Abrasion, - laceration  NEURO: - Change in behavior, - Dec. Alertness, - Headache, - Dizziness, - Change in speech, - Weakness, - Seizure-like activity, - Difficulty ambulating

## 2022-09-13 NOTE — ED ADULT TRIAGE NOTE - CHIEF COMPLAINT QUOTE
I have extreme anxiety; I had cardiac work up recently; I am under a tremendous amount of stress; I was driving to my therapist and my jaw and left side of my face felt real tight and almost felt kind if numb about 30min ago

## 2022-09-13 NOTE — ED PROVIDER NOTE - PATIENT PORTAL LINK FT
You can access the FollowMyHealth Patient Portal offered by [facility] by registering at the following website: [email or web address]. By joining [employer]’s FollowMyHealth portal, you will also be able to view your health information using other applications (apps) compatible with our system.

## 2022-11-22 ENCOUNTER — OUTPATIENT (OUTPATIENT)
Dept: OUTPATIENT SERVICES | Facility: HOSPITAL | Age: 52
LOS: 1 days | Discharge: STILL ADMITTED OR ADMINISTRATIVE | End: 2022-11-22
Payer: COMMERCIAL

## 2022-11-22 DIAGNOSIS — Z98.890 OTHER SPECIFIED POSTPROCEDURAL STATES: Chronic | ICD-10-CM

## 2022-11-22 PROBLEM — F41.9 ANXIETY DISORDER, UNSPECIFIED: Chronic | Status: ACTIVE | Noted: 2022-09-13

## 2022-11-22 PROCEDURE — 99214 OFFICE O/P EST MOD 30 MIN: CPT | Mod: 95

## 2022-11-22 PROCEDURE — 90838 PSYTX W PT W E/M 60 MIN: CPT | Mod: 95

## 2022-11-23 DIAGNOSIS — F42.9 OBSESSIVE-COMPULSIVE DISORDER, UNSPECIFIED: ICD-10-CM

## 2023-10-25 ENCOUNTER — OFFICE (OUTPATIENT)
Dept: URBAN - METROPOLITAN AREA CLINIC 90 | Facility: CLINIC | Age: 53
Setting detail: OPHTHALMOLOGY
End: 2023-10-25
Payer: COMMERCIAL

## 2023-10-25 DIAGNOSIS — D31.31: ICD-10-CM

## 2023-10-25 PROBLEM — H25.1: Status: ACTIVE | Noted: 2023-10-25

## 2023-10-25 PROCEDURE — 92250 FUNDUS PHOTOGRAPHY W/I&R: CPT | Performed by: OPHTHALMOLOGY

## 2023-10-25 PROCEDURE — 92004 COMPRE OPH EXAM NEW PT 1/>: CPT | Performed by: OPHTHALMOLOGY

## 2023-10-25 ASSESSMENT — TONOMETRY
OD_IOP_MMHG: 10
OS_IOP_MMHG: 10

## 2023-10-25 ASSESSMENT — CONFRONTATIONAL VISUAL FIELD TEST (CVF)
OD_FINDINGS: FULL
OS_FINDINGS: FULL

## 2023-10-26 ASSESSMENT — REFRACTION_CURRENTRX
OS_VPRISM_DIRECTION: SV
OS_CYLINDER: -0.75
OD_AXIS: 087
OD_VPRISM_DIRECTION: SV
OD_SPHERE: +0.25
OS_OVR_VA: 20/
OD_CYLINDER: -1.00
OS_AXIS: 083
OD_CYLINDER: -1.00
OS_AXIS: 080
OS_SPHERE: +1.75
OD_AXIS: 084
OD_OVR_VA: 20/
OD_OVR_VA: 20/
OS_SPHERE: +0.25
OS_OVR_VA: 20/
OS_CYLINDER: -0.75
OD_VPRISM_DIRECTION: SV
OD_SPHERE: +1.75
OS_VPRISM_DIRECTION: SV

## 2023-10-26 ASSESSMENT — KERATOMETRY
OS_K2POWER_DIOPTERS: 42.75
OD_K1POWER_DIOPTERS: 42.00
OD_K2POWER_DIOPTERS: 43.00
OS_AXISANGLE_DEGREES: 166
OS_K1POWER_DIOPTERS: 42.50
OD_AXISANGLE_DEGREES: 005

## 2023-10-26 ASSESSMENT — REFRACTION_MANIFEST
OS_CYLINDER: -1.00
OS_VA1: 20/20
OS_SPHERE: +0.75
OS_AXIS: 80
OS_VA2: 20/25(J1)
OD_SPHERE: +0.50
OS_SPHERE: +0.50
OD_AXIS: 95
OD_CYLINDER: -1.25
OD_SPHERE: +0.25
OD_ADD: +2.00
OD_VA2: 20/25(J1)
OD_VA1: 20/20
OD_CYLINDER: -1.75
OD_VA2: 20/25(J1)
OS_VA2: 20/25(J1)
OD_VA1: 20/20
OS_VA1: 20/20
OS_AXIS: 80
OD_ADD: +2.00
OS_ADD: +2.00
OS_CYLINDER: -1.00
OS_ADD: +2.00
OD_AXIS: 95

## 2023-10-26 ASSESSMENT — SPHEQUIV_DERIVED
OD_SPHEQUIV: -0.375
OS_SPHEQUIV: 0.25
OD_SPHEQUIV: -0.375
OS_SPHEQUIV: 0
OS_SPHEQUIV: 0.5
OD_SPHEQUIV: 0.125

## 2023-10-26 ASSESSMENT — AXIALLENGTH_DERIVED
OD_AL: 24.1167
OS_AL: 23.818
OS_AL: 23.9177
OD_AL: 23.9149
OS_AL: 23.7192
OD_AL: 24.1167

## 2023-10-26 ASSESSMENT — VISUAL ACUITY
OS_BCVA: 20/ 40
OD_BCVA: 20/30-

## 2023-10-26 ASSESSMENT — REFRACTION_AUTOREFRACTION
OS_CYLINDER: -1.00
OD_AXIS: 092
OS_SPHERE: +1.00
OD_CYLINDER: -1.75
OS_AXIS: 086
OD_SPHERE: +1.00

## 2024-06-27 NOTE — ASU PREOP CHECKLIST - WEIGHT IN LBS
2 and 3 rd attempt I called two times today and left messages informing the pt that her nurse visit with Alix is on 7/3 but at the [place] location and not [facility]   155.2

## 2024-10-07 ENCOUNTER — OFFICE (OUTPATIENT)
Age: 54
Setting detail: OPHTHALMOLOGY
End: 2024-10-07
Payer: COMMERCIAL

## 2024-10-07 DIAGNOSIS — D31.31: ICD-10-CM

## 2024-10-07 DIAGNOSIS — H25.13: ICD-10-CM

## 2024-10-07 PROCEDURE — 92014 COMPRE OPH EXAM EST PT 1/>: CPT | Performed by: OPHTHALMOLOGY

## 2024-10-07 PROCEDURE — 92250 FUNDUS PHOTOGRAPHY W/I&R: CPT | Performed by: OPHTHALMOLOGY

## 2024-10-07 ASSESSMENT — REFRACTION_CURRENTRX
OS_VPRISM_DIRECTION: SV
OS_AXIS: 083
OD_AXIS: 087
OS_VPRISM_DIRECTION: SV
OD_VPRISM_DIRECTION: SV
OD_OVR_VA: 20/
OD_CYLINDER: -1.00
OS_SPHERE: +0.25
OD_OVR_VA: 20/
OD_VPRISM_DIRECTION: SV
OD_SPHERE: +1.75
OD_VPRISM_DIRECTION: SV
OS_OVR_VA: 20/
OS_OVR_VA: 20/
OS_VPRISM_DIRECTION: SV
OS_OVR_VA: 20/
OS_SPHERE: +1.75
OD_SPHERE: +0.25
OD_CYLINDER: -1.00
OS_CYLINDER: -0.75
OS_AXIS: 080
OD_OVR_VA: 20/
OS_AXIS: 082
OS_CYLINDER: -0.75
OS_CYLINDER: -0.75
OD_AXIS: 084
OD_AXIS: 087
OD_CYLINDER: -1.00
OD_AXIS: 089
OD_VPRISM_DIRECTION: SV
OD_SPHERE: +1.75
OS_SPHERE: +1.75
OS_VPRISM_DIRECTION: SV
OS_SPHERE: +0.25
OS_AXIS: 080
OS_CYLINDER: -0.75
OD_CYLINDER: -1.00
OD_SPHERE: +0.25

## 2024-10-07 ASSESSMENT — REFRACTION_MANIFEST
OS_AXIS: 80
OS_AXIS: 80
OS_SPHERE: +0.75
OS_SPHERE: +0.50
OD_ADD: +2.00
OD_VA2: 20/25(J1)
OD_AXIS: 95
OD_VA2: 20/25(J1)
OS_ADD: +2.00
OD_CYLINDER: -1.75
OD_CYLINDER: -1.25
OS_CYLINDER: -1.00
OD_CYLINDER: -1.25
OD_SPHERE: +0.25
OS_VA1: 20/20
OD_VA1: 20/20
OD_SPHERE: +0.25
OS_VA1: 20/20
OS_AXIS: 80
OD_SPHERE: +0.50
OS_CYLINDER: -1.00
OD_AXIS: 95
OD_VA1: 20/20
OS_VA2: 20/25(J1)
OS_ADD: +2.00
OD_VA2: 20/25(J1)
OS_VA1: 20/20
OD_VA1: 20/20
OS_SPHERE: +0.50
OS_CYLINDER: -1.00
OS_VA2: 20/25(J1)
OD_AXIS: 95
OS_ADD: +2.00
OD_ADD: +2.00
OS_VA2: 20/25(J1)
OD_ADD: +2.00

## 2024-10-07 ASSESSMENT — KERATOMETRY
OS_K1POWER_DIOPTERS: 42.00
OS_K2POWER_DIOPTERS: 42.50
OD_K2POWER_DIOPTERS: 42.75
OS_AXISANGLE_DEGREES: 170
OD_K1POWER_DIOPTERS: 41.75
OD_AXISANGLE_DEGREES: 002

## 2024-10-07 ASSESSMENT — TONOMETRY
OS_IOP_MMHG: 10
OD_IOP_MMHG: 10

## 2024-10-07 ASSESSMENT — REFRACTION_AUTOREFRACTION
OD_AXIS: 092
OS_AXIS: 084
OD_SPHERE: +1.00
OD_CYLINDER: -1.75
OS_SPHERE: +1.00
OS_CYLINDER: -1.00

## 2024-10-07 ASSESSMENT — VISUAL ACUITY: OD_BCVA: 20/20

## 2024-10-07 ASSESSMENT — CONFRONTATIONAL VISUAL FIELD TEST (CVF)
OD_FINDINGS: FULL
OS_FINDINGS: FULL

## (undated) DEVICE — DOUBLE BASIN SET

## (undated) DEVICE — SUT PROLENE 2-0 30" CT-2

## (undated) DEVICE — SPONGE DISSECTOR PEANUT

## (undated) DEVICE — POSITIONER STRAP ARMBOARD VELCRO TS-30 12

## (undated) DEVICE — SUT PROLENE 1 30" CT-1

## (undated) DEVICE — WRAP COMPRESSION CALF MED

## (undated) DEVICE — SUT VICRYL 2-0 27" SH UNDYED

## (undated) DEVICE — DRAPE 3/4 SHEET 52X76"

## (undated) DEVICE — DRAIN PENROSE .25" X 12"

## (undated) DEVICE — CANISTER DISPOSABLE THIN WALL 3000CC

## (undated) DEVICE — SOL IRR POUR NS 0.9% 500ML

## (undated) DEVICE — SUT MONOCRYL 4-0 27" PS-2 UNDYED

## (undated) DEVICE — DRSG STERISTRIPS 0.5X4"

## (undated) DEVICE — ELCTR GROUNDING PAD ADULT COVIDIEN

## (undated) DEVICE — PREP BETADINE KIT

## (undated) DEVICE — DRAPE LAPAROTOMY TRANSVERSE

## (undated) DEVICE — SUT VICRYL 2-0 18" TIES UNDYED

## (undated) DEVICE — DRAIN PENROSE 0.5X12"

## (undated) DEVICE — PACK MINOR WITH LAP

## (undated) DEVICE — SUT PROLENE 0 30" CT-2

## (undated) DEVICE — ELCTR EDGE BOVIE INSULATED BLADE TIP 2.75"

## (undated) DEVICE — SYR LUER LOK 10CC

## (undated) DEVICE — SUT VICRYL 3-0 27" SH UNDYED

## (undated) DEVICE — SOL IRR POUR H2O 500ML

## (undated) DEVICE — BLADE SURGICAL CLIPPER GENERAL

## (undated) DEVICE — SUT VICRYL 0 27" CT-2 UNDYED